# Patient Record
Sex: FEMALE | Race: BLACK OR AFRICAN AMERICAN | HISPANIC OR LATINO | Employment: FULL TIME | URBAN - METROPOLITAN AREA
[De-identification: names, ages, dates, MRNs, and addresses within clinical notes are randomized per-mention and may not be internally consistent; named-entity substitution may affect disease eponyms.]

---

## 2023-04-21 ENCOUNTER — APPOINTMENT (OUTPATIENT)
Dept: PRIMARY CARE | Facility: CLINIC | Age: 58
End: 2023-04-21
Payer: COMMERCIAL

## 2023-05-03 DIAGNOSIS — I10 HYPERTENSION, UNSPECIFIED TYPE: ICD-10-CM

## 2023-05-03 RX ORDER — AMLODIPINE BESYLATE 10 MG/1
10 TABLET ORAL DAILY
Qty: 30 TABLET | Refills: 3 | Status: SHIPPED | OUTPATIENT
Start: 2023-05-03 | End: 2023-11-07 | Stop reason: SDUPTHER

## 2023-05-03 RX ORDER — AMLODIPINE BESYLATE 10 MG/1
10 TABLET ORAL DAILY
COMMUNITY
End: 2023-05-03 | Stop reason: SDUPTHER

## 2023-05-03 RX ORDER — SPIRONOLACTONE 25 MG/1
1 TABLET ORAL DAILY
COMMUNITY
End: 2023-11-30 | Stop reason: SDUPTHER

## 2023-11-07 DIAGNOSIS — I10 HYPERTENSION, UNSPECIFIED TYPE: ICD-10-CM

## 2023-11-07 RX ORDER — AMLODIPINE BESYLATE 10 MG/1
10 TABLET ORAL DAILY
Qty: 30 TABLET | Refills: 0 | Status: SHIPPED | OUTPATIENT
Start: 2023-11-07 | End: 2024-01-02 | Stop reason: SDUPTHER

## 2023-11-07 NOTE — TELEPHONE ENCOUNTER
Rx Refill Request Telephone Encounter    Name:  Betzy Rojas  :  [id number]  Medication Name:  Amlodipine 10mg            Specific Pharmacy location:  [facility]  Date of last appointment:  n/a  Date of next appointment:  n/a  Best number to reach patient:  Pt scheduled for 23  @ 940am

## 2023-11-15 ENCOUNTER — TELEPHONE (OUTPATIENT)
Dept: PRIMARY CARE | Facility: CLINIC | Age: 58
End: 2023-11-15
Payer: COMMERCIAL

## 2023-11-15 NOTE — TELEPHONE ENCOUNTER
Patient would like to get a water pill to go with her bp meds. States she had one at one point but stopped taking it    Please Advise    [facility] in [place]

## 2023-11-30 ENCOUNTER — OFFICE VISIT (OUTPATIENT)
Dept: PRIMARY CARE | Facility: CLINIC | Age: 58
End: 2023-11-30
Payer: COMMERCIAL

## 2023-11-30 VITALS
TEMPERATURE: 97.9 F | OXYGEN SATURATION: 92 % | BODY MASS INDEX: 53.92 KG/M2 | WEIGHT: 293 LBS | SYSTOLIC BLOOD PRESSURE: 146 MMHG | DIASTOLIC BLOOD PRESSURE: 89 MMHG | HEART RATE: 103 BPM | HEIGHT: 62 IN

## 2023-11-30 DIAGNOSIS — I10 HYPERTENSION, UNSPECIFIED TYPE: ICD-10-CM

## 2023-11-30 DIAGNOSIS — Z12.31 BREAST CANCER SCREENING BY MAMMOGRAM: Primary | ICD-10-CM

## 2023-11-30 PROBLEM — T78.40XA ALLERGIC REACTION: Status: ACTIVE | Noted: 2023-11-30

## 2023-11-30 PROBLEM — R79.89 ELEVATED SERUM CREATININE: Status: RESOLVED | Noted: 2023-11-30 | Resolved: 2023-11-30

## 2023-11-30 PROBLEM — R79.89 ELEVATED SERUM CREATININE: Status: ACTIVE | Noted: 2023-11-30

## 2023-11-30 PROCEDURE — 3079F DIAST BP 80-89 MM HG: CPT | Performed by: FAMILY MEDICINE

## 2023-11-30 PROCEDURE — 99214 OFFICE O/P EST MOD 30 MIN: CPT | Performed by: FAMILY MEDICINE

## 2023-11-30 PROCEDURE — 3077F SYST BP >= 140 MM HG: CPT | Performed by: FAMILY MEDICINE

## 2023-11-30 PROCEDURE — 1036F TOBACCO NON-USER: CPT | Performed by: FAMILY MEDICINE

## 2023-11-30 RX ORDER — SPIRONOLACTONE 25 MG/1
12.5 TABLET ORAL DAILY
Qty: 45 TABLET | Refills: 1 | Status: SHIPPED | OUTPATIENT
Start: 2023-11-30 | End: 2023-12-20 | Stop reason: SDUPTHER

## 2023-11-30 ASSESSMENT — ENCOUNTER SYMPTOMS
FEVER: 0
HEADACHES: 0
DIZZINESS: 0
COUGH: 0
HYPERTENSION: 1

## 2023-11-30 NOTE — PROGRESS NOTES
"Subjective   Patient ID: Betzy Rojas is a 58 y.o. female who presents for Hypertension.    Hypertension  This is a recurrent problem. Pertinent negatives include no chest pain or headaches.      Here today for follow-up on hypertension.  Last visit was December 2022  She is currently taking amlodipine 10 mg daily.  She had previously been taking spironolactone, but had stopped this medication because it was causing urinary urgency  She has had difficulty tolerating several other antihypertensives in the past including lisinopril, carvedilol and chlorthalidone.  All 3 of these medications caused tongue tingling  She has not been checking blood pressure out of the office.  Admits to poor diet and feels that she can improve her exercise  No chest pain, palpitations or dizziness    Review of Systems   Constitutional:  Negative for fever.   Respiratory:  Negative for cough.    Cardiovascular:  Positive for leg swelling. Negative for chest pain.   Neurological:  Negative for dizziness and headaches.       Objective   /89   Pulse 103   Temp 36.6 °C (97.9 °F) (Temporal)   Ht 1.575 m (5' 2\")   Wt (!) 164 kg (361 lb 12.8 oz)   SpO2 92%   BMI 66.17 kg/m²     Physical Exam  Vitals reviewed.   Constitutional:       General: She is not in acute distress.     Appearance: Normal appearance. She is well-developed.   HENT:      Head: Normocephalic.   Eyes:      Conjunctiva/sclera: Conjunctivae normal.   Cardiovascular:      Rate and Rhythm: Normal rate and regular rhythm.      Heart sounds: Normal heart sounds.   Pulmonary:      Effort: Pulmonary effort is normal.      Breath sounds: Normal breath sounds.   Musculoskeletal:      Right lower leg: Edema present.      Left lower leg: Edema present.      Comments: Trace bilateral leg edema   Skin:     Findings: No rash.   Neurological:      Mental Status: She is alert.   Psychiatric:         Mood and Affect: Mood normal.         Behavior: Behavior normal. "         Assessment/Plan   Problem List Items Addressed This Visit          Medium    Hypertension    Relevant Medications    spironolactone (Aldactone) 25 mg tablet    Other Relevant Orders    CBC    Comprehensive Metabolic Panel    Lipid Panel    TSH with reflex to Free T4 if abnormal    Referral to Nutrition Services     Other Visit Diagnoses       Breast cancer screening by mammogram    -  Primary    Relevant Orders    BI mammo bilateral screening tomosynthesis        Blood pressure today is elevated at 146/89.  We will restart spironolactone at 12.5 mg daily and continue amlodipine 10 mg daily.  Follow-up in 2 to 3 weeks for a nurse visit to recheck blood pressure.  Recommended getting labs done including CBC, BMP, lipid panel and TSH.  Since we are going to be starting spironolactone, I recommended that she wait at least 2 weeks before getting the labs done  We also discussed lifestyle modification we will refer her to nutritionist  Livier was ordered previously, I encouraged her to submit sample

## 2023-12-13 ENCOUNTER — TELEMEDICINE CLINICAL SUPPORT (OUTPATIENT)
Dept: NUTRITION | Facility: HOSPITAL | Age: 58
End: 2023-12-13
Payer: COMMERCIAL

## 2023-12-13 VITALS — WEIGHT: 293 LBS | HEIGHT: 62 IN | BODY MASS INDEX: 53.92 KG/M2

## 2023-12-13 DIAGNOSIS — I10 HYPERTENSION, UNSPECIFIED TYPE: ICD-10-CM

## 2023-12-13 PROCEDURE — 97802 MEDICAL NUTRITION INDIV IN: CPT | Mod: 95 | Performed by: FAMILY MEDICINE

## 2023-12-13 NOTE — PATIENT INSTRUCTIONS
1) Follow plate method when planning meals (1/2 plate non-starchy vegetables, 1/4 plate lean protein, 1/4 plate carbohydrates).   2) Increase fiber from fruits, vegetables, whole grains, and beans/lentils  3) Choose lean protein sources including chicken, turkey, seafood, and eggs.   4) Aim for at least 3 meals per day - be sure to include protein with each meal and snack  5) Limit added sugar to less than 25 g per day   6) Continue regular physical activity including strength training as medically appropriate per AHA guidelines      Provided pt with the following handouts: wt loss tips, 1500 calorie 5 day meal plan, choose your foods list, exercise, label reading, plate method

## 2023-12-13 NOTE — PROGRESS NOTES
"Nutrition Assessment     Reason for Visit:  Betzy Rojas is a 58 y.o. female who presents for   I10 (ICD-10-CM) - Hypertension, unspecified type     Anthropometrics:  Anthropometrics  Height: 157.5 cm (5' 2\")  Weight: (!) 164 kg (361 lb 8.9 oz)  BMI (Calculated): 66.11  IBW/kg (Dietitian Calculated): 50 kg  Percent of IBW: 328 %  Adjusted Body Weight (kg): 78.5 kg         Food And Nutrient Intake:  Food and Nutrient History  Food and Nutrient History: Met with pt today for wt management and hypertension. Pt reports that she has been gaining wt, unsure of time frame but estimates she has gained ~50 lbs. Pt states that she does not have a consistent meal intake, can go all day without eating. Pt states she tends to eat late at night as well. Pt reports eating take out daily.  Fluid Intake: water, gatorade, mushroom coffee  GI Symptoms: none  Oral Problems: denies  Dentition: own  Sleep Duration/Quality: 5-6 hrs disrupted     Food Intake  Meal 1: skips  Meal 2: skips or take out salad or BLT salad  Meal 3: may skip or have salmon, brocoli, rice from [employer]  Snacks: nuts    Food Preparation  Cooking:  (pt states she does not like to cook)  Grocery Shopping: Patient  Dining Out: Everyday    Micronutrient Intake  Vitamin Intake: Multivitamin         Medication and Complementary/Alternative Medicine Use  Nutrition-Related Complementary/Alternative Medicine Use: tumeric    Physical Activities:  Physical Activity  Physical Activity History: 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, has desk job, 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    Nutrition Focused Physical Exam:  Deferred due to virtual visit         Energy Needs  Calculated Energy Needs Using Equations  Height: 157.5 cm (5' 2\")  Estimated Energy Needs  Total Energy Estimated Needs (kCal): 1600 kCal  Total Estimated Energy Need per Day (kCal/kg): 20 kCal/kg  Method for Estimating Needs: adj IBW        Nutrition Diagnosis   Malnutrition " Diagnosis  Patient has Malnutrition Diagnosis: No  Nutrition Diagnosis  Patient has Nutrition Diagnosis: Yes  Diagnosis Status (1): New  Nutrition Diagnosis 1: Obese  Related to (1): excess caloric intake, inconsistent meal pattern, low active lifestyle  As Evidenced by (1): BMI of 66.13, pt interview and diet recall    Nutrition Interventions/Recommendations   Food and Nutrition Delivery  Meals & Snacks: Carbohydrate-modified diet, Energy-modified diet, Fiber-modified diet, General Healthful Diet, Protein-modified diet, Modify schedule of foods/fluids, Modify Composition of Meals/Snacks, Diets modified for specific foods or ingredients  Nutrition Education  Nutrition Education Content: Content related nutrition education, Education on nutrition's influence on health, Physical activity guidance  Goals: Instructed on counting macronutrient intake, proper portion sizes, label reading and general healthful nutrition. Instructed on benefits of wt loss with diabetes and heart health. Discussed mindful eating, slow gradual wt loss and goals beyond wt. Instructed pt to not skip meals - discussed this may lead to over eating later or snacking more than planned. Instructed on importance of physical activity for wt loss and maintenance of wt loss. Both verbal and written education provided in the one-on-one setting. Pt verbalized understanding of information provided. All questions answered to pt satisfaction throughout visit            Nutrition Monitoring and Evaluation   Food/Nutrient Related History Monitoring  Monitoring and Evaluation Plan: Energy intake, Meal/snack pattern, Fiber intake, Carbohydrate intake, Amount of food, Fluid intake, Protein intake, Mineral/element intake  Criteria: 1600 kcal/day  Criteria: aim for at least 64 oz of water daily  Criteria: Aim for 3 meals/day with 1-2 snack  Meal/Snack Pattern: Food variety, Estimated meal and snack pattern  Criteria: Follow plate method when planning meals (1/2  plate non-starchy vegetables, 1/4 plate lean protein, 1/4 plate carbohydrates).  Criteria: Choose lean protein sources including chicken, turkey, seafood, and eggs. Be sure to include protein with each meal and snack  Criteria: Limit added sugar to less than 25 g per day  Criteria: Increase fiber from fruits, vegetables, whole grains, and beans/lentils  Mineral/Element Intake: Measured mineral/element intake  Criteria: <2300 mg of sodium daily  Knowledge/Beliefs/Attitudes  Monitoring and Evaluation Plan: Physical activity  Criteria: Encouraged regular physical activity including strength training as medically appropriate per AHA guidelines

## 2023-12-13 NOTE — LETTER
"December 13, 2023     Vadim Augustin DO  [address]  [place] [facility] [address]    Patient: Betzy Rojas   YOB: 1965   Date of Visit: 12/13/2023       Dear Dr. Vadim Augustin DO:    Thank you for referring Betzy Rojas to me for evaluation. Below are my notes for this consultation.  If you have questions, please do not hesitate to call me. I look forward to following your patient along with you.       Sincerely,     Joyce Crook, RD, LD      CC: No Recipients  ______________________________________________________________________________________    Nutrition Assessment    Reason for Visit:  Betzy Rojas is a 58 y.o. female who presents for   I10 (ICD-10-CM) - Hypertension, unspecified type     Anthropometrics:  Anthropometrics  Height: 157.5 cm (5' 2\")  Weight: (!) 164 kg (361 lb 8.9 oz)  BMI (Calculated): 66.11  IBW/kg (Dietitian Calculated): 50 kg  Percent of IBW: 328 %  Adjusted Body Weight (kg): 78.5 kg         Food And Nutrient Intake:  Food and Nutrient History  Food and Nutrient History: Met with pt today for wt management and hypertension. Pt reports that she has been gaining wt, unsure of time frame but estimates she has gained ~50 lbs. Pt states that she does not have a consistent meal intake, can go all day without eating. Pt states she tends to eat late at night as well. Pt reports eating take out daily.  Fluid Intake: water, gatorade, mushroom coffee  GI Symptoms: none  Oral Problems: denies  Dentition: own  Sleep Duration/Quality: 5-6 hrs disrupted     Food Intake  Meal 1: skips  Meal 2: skips or take out salad or BLT salad  Meal 3: may skip or have salmon, brocoli, rice from [employer]  Snacks: nuts    Food Preparation  Cooking:  (pt states she does not like to cook)  Grocery Shopping: Patient  Dining Out: Everyday    Micronutrient Intake  Vitamin Intake: Multivitamin         Medication and Complementary/Alternative Medicine Use  Nutrition-Related Complementary/Alternative Medicine " "Use: tumeric    Physical Activities:  Physical Activity  Physical Activity History: 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, has desk job, 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    Nutrition Focused Physical Exam:  Deferred due to virtual visit         Energy Needs  Calculated Energy Needs Using Equations  Height: 157.5 cm (5' 2\")  Estimated Energy Needs  Total Energy Estimated Needs (kCal): 1600 kCal  Total Estimated Energy Need per Day (kCal/kg): 20 kCal/kg  Method for Estimating Needs: adj IBW        Nutrition Diagnosis  Malnutrition Diagnosis  Patient has Malnutrition Diagnosis: No  Nutrition Diagnosis  Patient has Nutrition Diagnosis: Yes  Diagnosis Status (1): New  Nutrition Diagnosis 1: Obese  Related to (1): excess caloric intake, inconsistent meal pattern, low active lifestyle  As Evidenced by (1): BMI of 66.13, pt interview and diet recall    Nutrition Interventions/Recommendations  Food and Nutrition Delivery  Meals & Snacks: Carbohydrate-modified diet, Energy-modified diet, Fiber-modified diet, General Healthful Diet, Protein-modified diet, Modify schedule of foods/fluids, Modify Composition of Meals/Snacks, Diets modified for specific foods or ingredients  Nutrition Education  Nutrition Education Content: Content related nutrition education, Education on nutrition's influence on health, Physical activity guidance  Goals: Instructed on counting macronutrient intake, proper portion sizes, label reading and general healthful nutrition. Instructed on benefits of wt loss with diabetes and heart health. Discussed mindful eating, slow gradual wt loss and goals beyond wt. Instructed pt to not skip meals - discussed this may lead to over eating later or snacking more than planned. Instructed on importance of physical activity for wt loss and maintenance of wt loss. Both verbal and written education provided in the one-on-one setting. Pt verbalized understanding of information provided. All " questions answered to pt satisfaction throughout visit            Nutrition Monitoring and Evaluation  Food/Nutrient Related History Monitoring  Monitoring and Evaluation Plan: Energy intake, Meal/snack pattern, Fiber intake, Carbohydrate intake, Amount of food, Fluid intake, Protein intake, Mineral/element intake  Criteria: 1600 kcal/day  Criteria: aim for at least 64 oz of water daily  Criteria: Aim for 3 meals/day with 1-2 snack  Meal/Snack Pattern: Food variety, Estimated meal and snack pattern  Criteria: Follow plate method when planning meals (1/2 plate non-starchy vegetables, 1/4 plate lean protein, 1/4 plate carbohydrates).  Criteria: Choose lean protein sources including chicken, turkey, seafood, and eggs. Be sure to include protein with each meal and snack  Criteria: Limit added sugar to less than 25 g per day  Criteria: Increase fiber from fruits, vegetables, whole grains, and beans/lentils  Mineral/Element Intake: Measured mineral/element intake  Criteria: <2300 mg of sodium daily  Knowledge/Beliefs/Attitudes  Monitoring and Evaluation Plan: Physical activity  Criteria: Encouraged regular physical activity including strength training as medically appropriate per AHA guidelines

## 2023-12-20 ENCOUNTER — OFFICE VISIT (OUTPATIENT)
Dept: PRIMARY CARE | Facility: CLINIC | Age: 58
End: 2023-12-20
Payer: COMMERCIAL

## 2023-12-20 VITALS
WEIGHT: 293 LBS | DIASTOLIC BLOOD PRESSURE: 96 MMHG | SYSTOLIC BLOOD PRESSURE: 154 MMHG | OXYGEN SATURATION: 95 % | HEART RATE: 111 BPM | BODY MASS INDEX: 66.03 KG/M2

## 2023-12-20 DIAGNOSIS — I10 HYPERTENSION, UNSPECIFIED TYPE: ICD-10-CM

## 2023-12-20 PROCEDURE — 1036F TOBACCO NON-USER: CPT | Performed by: FAMILY MEDICINE

## 2023-12-20 PROCEDURE — 99211 OFF/OP EST MAY X REQ PHY/QHP: CPT | Performed by: FAMILY MEDICINE

## 2023-12-20 PROCEDURE — 3080F DIAST BP >= 90 MM HG: CPT | Performed by: FAMILY MEDICINE

## 2023-12-20 PROCEDURE — 3077F SYST BP >= 140 MM HG: CPT | Performed by: FAMILY MEDICINE

## 2023-12-20 RX ORDER — SPIRONOLACTONE 25 MG/1
25 TABLET ORAL DAILY
Qty: 90 TABLET | Refills: 1
Start: 2023-12-20 | End: 2024-02-05 | Stop reason: SDUPTHER

## 2023-12-20 ASSESSMENT — ENCOUNTER SYMPTOMS: HYPERTENSION: 1

## 2023-12-20 NOTE — PROGRESS NOTES
Subjective   Patient ID: Betzy Rojas is a 58 y.o. female who presents for Hypertension.    Hypertension       I spoke with her.  She stop by the office today for a nurse visit to check blood pressure  She has a history of hypertension currently taking amlodipine 10 mg daily and spironolactone 12.5 mg daily.  Her blood pressure at last visit on 11/30 was 146/89.  We had started spironolactone at that time  She is not having any adverse effects with medication.  She has not yet had a chance to get the labs done that we had ordered previously.  She did see nutritionist and is planning on improving diet and exercise.  She previously was not able to tolerate several other antihypertensives including lisinopril, carvedilol and chlorthalidone due to tongue tingling when taking  Her blood pressure today is elevated at 154/96.  Will increase spironolactone from 12.5 to 25 mg daily and continue amlodipine.  Discussed the importance of getting labs done, especially since she is taking spironolactone, and she will get these done prior to follow-up.  Follow-up in 1 month for a full hypertension recheck    Review of Systems    Objective   There were no vitals taken for this visit.    Physical Exam    Assessment/Plan   Problem List Items Addressed This Visit    None

## 2024-01-02 DIAGNOSIS — I10 HYPERTENSION, UNSPECIFIED TYPE: ICD-10-CM

## 2024-01-02 RX ORDER — AMLODIPINE BESYLATE 10 MG/1
10 TABLET ORAL DAILY
Qty: 90 TABLET | Refills: 1 | Status: SHIPPED | OUTPATIENT
Start: 2024-01-02

## 2024-01-02 NOTE — TELEPHONE ENCOUNTER
Rx Refill Request Telephone Encounter    Name:  Betzy Rojas  :  [id number]  Medication Name: Amlodipine refill request

## 2024-01-13 ENCOUNTER — HOSPITAL ENCOUNTER (OUTPATIENT)
Dept: RADIOLOGY | Facility: HOSPITAL | Age: 59
Discharge: HOME | End: 2024-01-13
Payer: COMMERCIAL

## 2024-01-13 DIAGNOSIS — Z12.31 BREAST CANCER SCREENING BY MAMMOGRAM: ICD-10-CM

## 2024-01-13 PROCEDURE — 77063 BREAST TOMOSYNTHESIS BI: CPT | Performed by: RADIOLOGY

## 2024-01-13 PROCEDURE — 77067 SCR MAMMO BI INCL CAD: CPT

## 2024-01-13 PROCEDURE — 77067 SCR MAMMO BI INCL CAD: CPT | Performed by: RADIOLOGY

## 2024-01-25 ENCOUNTER — LAB (OUTPATIENT)
Dept: LAB | Facility: LAB | Age: 59
End: 2024-01-25
Payer: COMMERCIAL

## 2024-01-25 DIAGNOSIS — R73.01 IFG (IMPAIRED FASTING GLUCOSE): ICD-10-CM

## 2024-01-25 DIAGNOSIS — R73.01 IFG (IMPAIRED FASTING GLUCOSE): Primary | ICD-10-CM

## 2024-01-25 DIAGNOSIS — I10 HYPERTENSION, UNSPECIFIED TYPE: ICD-10-CM

## 2024-01-25 LAB
ALBUMIN SERPL BCP-MCNC: 4.2 G/DL (ref 3.4–5)
ALP SERPL-CCNC: 88 U/L (ref 33–110)
ALT SERPL W P-5'-P-CCNC: 13 U/L (ref 7–45)
ANION GAP SERPL CALC-SCNC: 13 MMOL/L (ref 10–20)
AST SERPL W P-5'-P-CCNC: 16 U/L (ref 9–39)
BILIRUB SERPL-MCNC: 0.8 MG/DL (ref 0–1.2)
BUN SERPL-MCNC: 13 MG/DL (ref 6–23)
CALCIUM SERPL-MCNC: 9.6 MG/DL (ref 8.6–10.3)
CHLORIDE SERPL-SCNC: 102 MMOL/L (ref 98–107)
CHOLEST SERPL-MCNC: 174 MG/DL (ref 0–199)
CHOLESTEROL/HDL RATIO: 4.4
CO2 SERPL-SCNC: 30 MMOL/L (ref 21–32)
CREAT SERPL-MCNC: 1.01 MG/DL (ref 0.5–1.05)
EGFRCR SERPLBLD CKD-EPI 2021: 65 ML/MIN/1.73M*2
ERYTHROCYTE [DISTWIDTH] IN BLOOD BY AUTOMATED COUNT: 15.4 % (ref 11.5–14.5)
EST. AVERAGE GLUCOSE BLD GHB EST-MCNC: 134 MG/DL
GLUCOSE SERPL-MCNC: 114 MG/DL (ref 74–99)
HBA1C MFR BLD: 6.3 %
HCT VFR BLD AUTO: 43.2 % (ref 36–46)
HDLC SERPL-MCNC: 40 MG/DL
HGB BLD-MCNC: 13.6 G/DL (ref 12–16)
LDLC SERPL CALC-MCNC: 119 MG/DL
MCH RBC QN AUTO: 25.9 PG (ref 26–34)
MCHC RBC AUTO-ENTMCNC: 31.5 G/DL (ref 32–36)
MCV RBC AUTO: 82 FL (ref 80–100)
NON HDL CHOLESTEROL: 134 MG/DL (ref 0–149)
NRBC BLD-RTO: 0 /100 WBCS (ref 0–0)
PLATELET # BLD AUTO: 315 X10*3/UL (ref 150–450)
POTASSIUM SERPL-SCNC: 4.8 MMOL/L (ref 3.5–5.3)
PROT SERPL-MCNC: 7.3 G/DL (ref 6.4–8.2)
RBC # BLD AUTO: 5.25 X10*6/UL (ref 4–5.2)
SODIUM SERPL-SCNC: 140 MMOL/L (ref 136–145)
TRIGL SERPL-MCNC: 77 MG/DL (ref 0–149)
TSH SERPL-ACNC: 0.91 MIU/L (ref 0.44–3.98)
VLDL: 15 MG/DL (ref 0–40)
WBC # BLD AUTO: 5.6 X10*3/UL (ref 4.4–11.3)

## 2024-01-25 PROCEDURE — 36415 COLL VENOUS BLD VENIPUNCTURE: CPT

## 2024-01-25 PROCEDURE — 85027 COMPLETE CBC AUTOMATED: CPT

## 2024-01-25 PROCEDURE — 84443 ASSAY THYROID STIM HORMONE: CPT

## 2024-01-25 PROCEDURE — 83036 HEMOGLOBIN GLYCOSYLATED A1C: CPT

## 2024-01-25 PROCEDURE — 80061 LIPID PANEL: CPT

## 2024-01-25 PROCEDURE — 80053 COMPREHEN METABOLIC PANEL: CPT

## 2024-01-26 ENCOUNTER — OFFICE VISIT (OUTPATIENT)
Dept: PRIMARY CARE | Facility: CLINIC | Age: 59
End: 2024-01-26
Payer: COMMERCIAL

## 2024-01-26 VITALS — OXYGEN SATURATION: 94 % | SYSTOLIC BLOOD PRESSURE: 144 MMHG | HEART RATE: 119 BPM | DIASTOLIC BLOOD PRESSURE: 80 MMHG

## 2024-01-26 DIAGNOSIS — Z12.11 SCREENING FOR COLON CANCER: ICD-10-CM

## 2024-01-26 DIAGNOSIS — R73.03 PREDIABETES: ICD-10-CM

## 2024-01-26 DIAGNOSIS — I10 HYPERTENSION, UNSPECIFIED TYPE: Primary | ICD-10-CM

## 2024-01-26 PROCEDURE — 3079F DIAST BP 80-89 MM HG: CPT | Performed by: FAMILY MEDICINE

## 2024-01-26 PROCEDURE — 99214 OFFICE O/P EST MOD 30 MIN: CPT | Performed by: FAMILY MEDICINE

## 2024-01-26 PROCEDURE — 3077F SYST BP >= 140 MM HG: CPT | Performed by: FAMILY MEDICINE

## 2024-01-26 PROCEDURE — 1036F TOBACCO NON-USER: CPT | Performed by: FAMILY MEDICINE

## 2024-01-26 ASSESSMENT — ENCOUNTER SYMPTOMS
FEVER: 0
COUGH: 0
DIZZINESS: 0

## 2024-01-26 NOTE — PROGRESS NOTES
Subjective   Patient ID: Betzy Rojas is a 58 y.o. female who presents for Hypertension.    HPI   Here today for follow-up on hypertension  Currently taking spironolactone 25 mg, and amlodipine 10 mg daily.  She is supposed to be taking spironolactone every day, but she has been only taking this medication every other day.  She last took this medication 2 to 3 days ago  She is planning on improving exercise and is going to be joining a gym.  Has been working on improving her diet  Does not check blood pressure out of the office  She previously could not tolerate lisinopril, carvedilol, and chlorthalidone due to tongue tingling when taking  We reviewed labs from yesterday which showed the following  A1c: 6.3%  Lipid panel showed total cholesterol 174, , triglycerides 77, HDL 40.0      Review of Systems   Constitutional:  Negative for fever.   Respiratory:  Negative for cough.    Cardiovascular:  Positive for leg swelling. Negative for chest pain.   Neurological:  Negative for dizziness.       Objective   /80   Pulse (!) 119   LMP  (LMP Unknown)   SpO2 94%     Physical Exam  Vitals reviewed.   Constitutional:       General: She is not in acute distress.     Appearance: Normal appearance. She is well-developed.   HENT:      Head: Normocephalic.   Eyes:      Conjunctiva/sclera: Conjunctivae normal.   Cardiovascular:      Rate and Rhythm: Normal rate and regular rhythm.      Heart sounds: Normal heart sounds.   Pulmonary:      Effort: Pulmonary effort is normal.      Breath sounds: Normal breath sounds.   Musculoskeletal:      Right lower leg: No edema.      Left lower leg: No edema.   Skin:     Findings: No rash.   Neurological:      Mental Status: She is alert.   Psychiatric:         Mood and Affect: Mood normal.         Behavior: Behavior normal.         Assessment/Plan   Problem List Items Addressed This Visit          Medium    Hypertension - Primary     Other Visit Diagnoses       Prediabetes         Screening for colon cancer        Relevant Orders    Cologuard® colon cancer screening        Hypertension: Her blood pressure today is elevated at 144/80.  She has only been taking spironolactone every other day, and took this medication last approximately 2 to 3 days ago.  She is agreeable to taking this medicine daily.  Continue spironolactone 25 mg daily will continue amlodipine 10 mg daily.  She will continue to work on healthy diet and exercise and weight loss.  Follow-up in 3 to 4 months to recheck blood pressure  Her A1c is in the prediabetes range at 6.3%.  We discussed healthy diet and we will plan on checking this again in 4 to 6 months  Also discussed the importance of colon cancer screening and I reordered Cologuard

## 2024-02-05 DIAGNOSIS — I10 HYPERTENSION, UNSPECIFIED TYPE: ICD-10-CM

## 2024-02-05 RX ORDER — SPIRONOLACTONE 25 MG/1
25 TABLET ORAL DAILY
Qty: 90 TABLET | Refills: 1 | Status: SHIPPED | OUTPATIENT
Start: 2024-02-05

## 2024-02-05 NOTE — TELEPHONE ENCOUNTER
Rx Refill Request Telephone Encounter    Name:  Betzy Bob  :  [id number]  Medication Name: spironolactone refill request

## 2024-05-24 ENCOUNTER — APPOINTMENT (OUTPATIENT)
Dept: PRIMARY CARE | Facility: CLINIC | Age: 59
End: 2024-05-24
Payer: COMMERCIAL

## 2024-06-05 ENCOUNTER — APPOINTMENT (OUTPATIENT)
Dept: PRIMARY CARE | Facility: CLINIC | Age: 59
End: 2024-06-05
Payer: COMMERCIAL

## 2024-08-15 ENCOUNTER — TELEPHONE (OUTPATIENT)
Dept: PRIMARY CARE | Facility: CLINIC | Age: 59
End: 2024-08-15
Payer: COMMERCIAL

## 2024-08-15 NOTE — TELEPHONE ENCOUNTER
Patient had relocated to [facility] for work and was unable to come in for an appointment.  She is going on vacation and will call back when she returns to schedule.    ----- Message -----  From: Vadim Augustin DO  Sent: 8/15/2024  10:58 AM EDT  To: Do Emiliana Eller [employer]    She is due for a follow-up on hypertension, please call and schedule

## 2024-09-18 DIAGNOSIS — I10 HYPERTENSION, UNSPECIFIED TYPE: ICD-10-CM

## 2024-09-18 RX ORDER — AMLODIPINE BESYLATE 10 MG/1
10 TABLET ORAL DAILY
Qty: 90 TABLET | Refills: 3 | Status: SHIPPED | OUTPATIENT
Start: 2024-09-18

## 2024-09-26 ENCOUNTER — OFFICE VISIT (OUTPATIENT)
Dept: PRIMARY CARE | Facility: CLINIC | Age: 59
End: 2024-09-26
Payer: COMMERCIAL

## 2024-09-26 VITALS — SYSTOLIC BLOOD PRESSURE: 140 MMHG | OXYGEN SATURATION: 97 % | HEART RATE: 115 BPM | DIASTOLIC BLOOD PRESSURE: 88 MMHG

## 2024-09-26 DIAGNOSIS — I10 HYPERTENSION, UNSPECIFIED TYPE: Primary | ICD-10-CM

## 2024-09-26 PROCEDURE — 3079F DIAST BP 80-89 MM HG: CPT | Performed by: FAMILY MEDICINE

## 2024-09-26 PROCEDURE — 99214 OFFICE O/P EST MOD 30 MIN: CPT | Performed by: FAMILY MEDICINE

## 2024-09-26 PROCEDURE — 3077F SYST BP >= 140 MM HG: CPT | Performed by: FAMILY MEDICINE

## 2024-09-26 PROCEDURE — 1036F TOBACCO NON-USER: CPT | Performed by: FAMILY MEDICINE

## 2024-09-26 ASSESSMENT — ENCOUNTER SYMPTOMS
FEVER: 0
COUGH: 1
HYPERTENSION: 1

## 2024-09-26 NOTE — PROGRESS NOTES
Subjective   Patient ID: Betzy Rojas is a 59 y.o. female who presents for Hypertension.    Hypertension  Pertinent negatives include no chest pain.      Here today for follow-up on hypertension  She is currently taking amlodipine 10 mg daily and spironolactone 25 mg.  She is supposed to be taking spironolactone every day, but has only been taking it on average every other day.  She states her last dose of spironolactone was a few days ago.  No adverse effects with either medication  She has not been checking blood pressure out of the office  She is planning on improving exercise and working on diet  She previously had difficulty tolerating several other antihypertensive medications in the past including lisinopril, carvedilol and chlorthalidone (developed tongue tingling when taking)  She has a history of A1c in the prediabetic range in the past.  Her last A1c checked in January of this year was 6.3%    Review of Systems   Constitutional:  Negative for fever.   Respiratory:  Positive for cough.    Cardiovascular:  Negative for chest pain and leg swelling.       Objective   /88   Pulse (!) 115   LMP  (LMP Unknown)   SpO2 97%     Physical Exam  Vitals reviewed.   Constitutional:       General: She is not in acute distress.     Appearance: Normal appearance. She is well-developed.   HENT:      Head: Normocephalic.   Eyes:      Conjunctiva/sclera: Conjunctivae normal.   Cardiovascular:      Rate and Rhythm: Normal rate and regular rhythm.      Heart sounds: Normal heart sounds.   Pulmonary:      Effort: Pulmonary effort is normal.      Breath sounds: Normal breath sounds.   Musculoskeletal:      Right lower leg: No edema.      Left lower leg: No edema.   Skin:     Findings: No rash.   Neurological:      Mental Status: She is alert.   Psychiatric:         Mood and Affect: Mood normal.         Behavior: Behavior normal.         Assessment/Plan   Assessment & Plan  Hypertension, unspecified type         Her blood  pressure was initially elevated at 175/92.  This improved to 140/88 on recheck.  Her blood pressure today is still above goal.  She has not been taking spironolactone on a regular basis and has not taken in the past several days.  I recommended that she start taking spironolactone every day and continue amlodipine 10 mg daily  Continue to work on healthy diet and regular exercise and I recommended that she follow-up in the next 1 to 2 months for a nurse visit to recheck her blood pressure  I did offer to recheck labs including A1c.  She would prefer to wait until next visit to get labs done  Follow-up in 1 to 2 months for nurse visit to check blood pressure and in 3 months for hypertension recheck

## 2024-10-09 ENCOUNTER — APPOINTMENT (OUTPATIENT)
Dept: PRIMARY CARE | Facility: CLINIC | Age: 59
End: 2024-10-09
Payer: COMMERCIAL

## 2024-11-07 ENCOUNTER — APPOINTMENT (OUTPATIENT)
Dept: PRIMARY CARE | Facility: CLINIC | Age: 59
End: 2024-11-07
Payer: COMMERCIAL

## 2024-11-13 ENCOUNTER — TELEPHONE (OUTPATIENT)
Dept: PRIMARY CARE | Facility: CLINIC | Age: 59
End: 2024-11-13

## 2024-11-13 NOTE — TELEPHONE ENCOUNTER
Patient had to cancel her appointment for BP check (nurse visit) on 11/15/24.  She stated that she no longer is working at her company and is without medical insurance.  She states that she is signing up for COBRA and will call once she receives the information, and will be good to go for her appointment in January.  She is hoping to still receive her BP refills as needed until then.

## 2024-11-15 ENCOUNTER — APPOINTMENT (OUTPATIENT)
Dept: PRIMARY CARE | Facility: CLINIC | Age: 59
End: 2024-11-15
Payer: COMMERCIAL

## 2024-11-26 DIAGNOSIS — I10 HYPERTENSION, UNSPECIFIED TYPE: ICD-10-CM

## 2024-11-26 RX ORDER — SPIRONOLACTONE 25 MG/1
25 TABLET ORAL DAILY
Qty: 90 TABLET | Refills: 1 | Status: SHIPPED | OUTPATIENT
Start: 2024-11-26

## 2025-01-07 ENCOUNTER — APPOINTMENT (OUTPATIENT)
Dept: PRIMARY CARE | Facility: CLINIC | Age: 60
End: 2025-01-07
Payer: COMMERCIAL

## 2025-02-13 ENCOUNTER — APPOINTMENT (OUTPATIENT)
Dept: PRIMARY CARE | Facility: CLINIC | Age: 60
End: 2025-02-13

## 2025-02-13 DIAGNOSIS — I10 HYPERTENSION, UNSPECIFIED TYPE: ICD-10-CM

## 2025-02-13 RX ORDER — AMLODIPINE BESYLATE 10 MG/1
10 TABLET ORAL DAILY
Qty: 90 TABLET | Refills: 0 | Status: SHIPPED | OUTPATIENT
Start: 2025-02-13

## 2025-02-13 NOTE — TELEPHONE ENCOUNTER
Pt still in [facility]. Requesting refill. Should be back in a few weeks and schedule appt.

## 2025-02-28 ENCOUNTER — APPOINTMENT (OUTPATIENT)
Dept: PRIMARY CARE | Facility: CLINIC | Age: 60
End: 2025-02-28

## 2025-03-28 ENCOUNTER — APPOINTMENT (OUTPATIENT)
Dept: PRIMARY CARE | Facility: CLINIC | Age: 60
End: 2025-03-28

## 2025-04-17 ENCOUNTER — APPOINTMENT (OUTPATIENT)
Dept: PRIMARY CARE | Facility: CLINIC | Age: 60
End: 2025-04-17

## 2025-05-20 ENCOUNTER — HOSPITAL ENCOUNTER (OUTPATIENT)
Dept: RADIOLOGY | Facility: HOSPITAL | Age: 60
Discharge: HOME | End: 2025-05-20
Payer: COMMERCIAL

## 2025-05-20 ENCOUNTER — TELEPHONE (OUTPATIENT)
Dept: PRIMARY CARE | Facility: CLINIC | Age: 60
End: 2025-05-20
Payer: COMMERCIAL

## 2025-05-20 DIAGNOSIS — Z12.31 ENCOUNTER FOR SCREENING MAMMOGRAM FOR MALIGNANT NEOPLASM OF BREAST: ICD-10-CM

## 2025-05-20 DIAGNOSIS — Z12.11 SCREENING FOR COLON CANCER: Primary | ICD-10-CM

## 2025-05-20 PROCEDURE — 77067 SCR MAMMO BI INCL CAD: CPT

## 2025-05-20 PROCEDURE — 77063 BREAST TOMOSYNTHESIS BI: CPT | Performed by: RADIOLOGY

## 2025-05-20 PROCEDURE — 77067 SCR MAMMO BI INCL CAD: CPT | Performed by: RADIOLOGY

## 2025-05-20 NOTE — TELEPHONE ENCOUNTER
Pt called in stating she never received her Cologaurd and she would like to complete that before her upcoming appt in [place]

## 2025-05-23 ENCOUNTER — OFFICE VISIT (OUTPATIENT)
Dept: OBSTETRICS AND GYNECOLOGY | Facility: CLINIC | Age: 60
End: 2025-05-23
Payer: COMMERCIAL

## 2025-05-23 ENCOUNTER — APPOINTMENT (OUTPATIENT)
Dept: OBSTETRICS AND GYNECOLOGY | Facility: CLINIC | Age: 60
End: 2025-05-23
Payer: COMMERCIAL

## 2025-05-23 VITALS — SYSTOLIC BLOOD PRESSURE: 129 MMHG | BODY MASS INDEX: 68.41 KG/M2 | DIASTOLIC BLOOD PRESSURE: 89 MMHG | WEIGHT: 293 LBS

## 2025-05-23 DIAGNOSIS — N93.9 ABNORMAL UTERINE BLEEDING (AUB): ICD-10-CM

## 2025-05-23 DIAGNOSIS — N95.0 POSTMENOPAUSAL BLEEDING: Primary | ICD-10-CM

## 2025-05-23 PROCEDURE — 99203 OFFICE O/P NEW LOW 30 MIN: CPT | Performed by: OBSTETRICS & GYNECOLOGY

## 2025-05-23 PROCEDURE — 3079F DIAST BP 80-89 MM HG: CPT | Performed by: OBSTETRICS & GYNECOLOGY

## 2025-05-23 PROCEDURE — 3074F SYST BP LT 130 MM HG: CPT | Performed by: OBSTETRICS & GYNECOLOGY

## 2025-05-23 PROCEDURE — 58100 BIOPSY OF UTERUS LINING: CPT | Performed by: OBSTETRICS & GYNECOLOGY

## 2025-05-23 PROCEDURE — 1036F TOBACCO NON-USER: CPT | Performed by: OBSTETRICS & GYNECOLOGY

## 2025-05-23 RX ORDER — BUPIVACAINE HYDROCHLORIDE 2.5 MG/ML
10 INJECTION, SOLUTION INFILTRATION; PERINEURAL ONCE
Status: COMPLETED | OUTPATIENT
Start: 2025-05-23 | End: 2025-05-23

## 2025-05-23 RX ADMIN — BUPIVACAINE HYDROCHLORIDE 25 MG: 2.5 INJECTION, SOLUTION INFILTRATION; PERINEURAL at 15:20

## 2025-05-23 ASSESSMENT — LIFESTYLE VARIABLES
HOW OFTEN DO YOU HAVE SIX OR MORE DRINKS ON ONE OCCASION: NEVER
HOW OFTEN DO YOU HAVE A DRINK CONTAINING ALCOHOL: NEVER
SKIP TO QUESTIONS 9-10: 1
HOW MANY STANDARD DRINKS CONTAINING ALCOHOL DO YOU HAVE ON A TYPICAL DAY: PATIENT DOES NOT DRINK
AUDIT-C TOTAL SCORE: 0

## 2025-05-23 ASSESSMENT — PATIENT HEALTH QUESTIONNAIRE - PHQ9
1. LITTLE INTEREST OR PLEASURE IN DOING THINGS: NOT AT ALL
2. FEELING DOWN, DEPRESSED OR HOPELESS: NOT AT ALL
SUM OF ALL RESPONSES TO PHQ9 QUESTIONS 1 & 2: 0

## 2025-05-23 ASSESSMENT — SOCIAL DETERMINANTS OF HEALTH (SDOH)

## 2025-05-23 NOTE — PROGRESS NOTES
GYNECOLOGY PROGRESS NOTE          CC:     Chief Complaint   Patient presents with    Vaginal Bleeding        HPI:  Betzy Rojas is here with new complaint of postmenopausal bleeding times 1.5 weeks, heavy.  Menopause around 51, not sexually active for 9 months.  No h/o fibroids, endometriosis, ovarian cysts.  Does not remember last PAP test.  Some cramping, not lightheaded or dizzy.  Working with PCP on HTN/glucose.  G0 by choice.    Ricarda Barnes MD  Medical History[1]  Surgical History[2]  Social History[3]  Family History[4]   [unfilled]    ROS:  GYN - see HPI        PHYSICAL EXAM:  /89   Wt (!) 170 kg (374 lb)   LMP  (LMP Unknown)   BMI 68.41 kg/m²   GEN:  A&O, NAD  HEENT:  head HC/AT, no visible goiter  PSYCH:  normal affect, non-anxious  EGBUS normal, vaginal normal, cervix nullip normal  Uterus anteverted non tender, adnexa not palpable    IMPRESSION/PLAN:    Problem List Items Addressed This Visit    None  Visit Diagnoses         Postmenopausal bleeding    -  Primary    Relevant Medications    BUPivacaine HCl (Marcaine) 0.25 % (2.5 mg/mL) injection 25 mg (Completed) (Start on 5/23/2025  3:45 PM)    Other Relevant Orders    US PELVIS TRANSABDOMINAL WITH TRANSVAGINAL    THINPREP PAP TEST (>30)    Endometrial biopsy    Surgical Pathology Exam      Abnormal uterine bleeding (AUB)        Relevant Medications    BUPivacaine HCl (Marcaine) 0.25 % (2.5 mg/mL) injection 25 mg (Completed) (Start on 5/23/2025  3:45 PM)    Other Relevant Orders    THINPREP PAP TEST (>30)    Endometrial biopsy    Surgical Pathology Exam            D/w with pt could be uterine polyp or cancer which can be treated.  EMB performed,  Need pelvic US will call with results.Patient ID: Betzy Rojas is a 59 y.o. female.    Endometrial biopsy    Date/Time: 5/23/2025 3:39 PM    Performed by: Ricrada CABA MD  Authorized by: Ricarda CABA MD    Consent:     Consent obtained: verbal    Consent given by: patient     Risks discussed: bleeding  Indications:     Indications: postmenopausal bleeding    Procedure:     A bimanual exam was performed: yes      Uterus position: anteverted    Prepped with: Betadine    Tenaculum used: yes      A local block was performed: yes      Block method: paracervical block      Local anesthetic: bupivacaine 0.25% w/o epi    Amount used (mL): 10    Cervix dilated: no      Number of passes: 2  Findings:     Cervix: normal      Uterus depth by sound (cm): 10    Specimen collected: specimen collected and sent to pathology      Patient tolerance: tolerated well, no immediate complications               [1]   Past Medical History:  Diagnosis Date    Hypertension    [2]   Past Surgical History:  Procedure Laterality Date    OTHER SURGICAL HISTORY  2020    No history of surgery   [3]   Social History  Tobacco Use    Smoking status: Never    Smokeless tobacco: Never   Vaping Use    Vaping status: Never Used   Substance Use Topics    Alcohol use: Not Currently    Drug use: Never   [4]   Family History  Problem Relation Name Age of Onset    Breast cancer Mother Rubi Cash ()     Hypertension Mother Rubi Cash ()     Heart disease Mother Rubi Cash ()     Kidney disease Mother Rubi Shreya ()     Hypertension Father Trevor ()     Diabetes Father Trevor ()     Heart disease Father Trevor ()     Stroke Father Trevor ()

## 2025-05-28 ENCOUNTER — HOSPITAL ENCOUNTER (OUTPATIENT)
Dept: RADIOLOGY | Facility: HOSPITAL | Age: 60
Discharge: HOME | End: 2025-05-28
Payer: COMMERCIAL

## 2025-05-28 DIAGNOSIS — N95.0 POSTMENOPAUSAL BLEEDING: ICD-10-CM

## 2025-05-28 PROCEDURE — 76856 US EXAM PELVIC COMPLETE: CPT | Performed by: RADIOLOGY

## 2025-05-28 PROCEDURE — 76830 TRANSVAGINAL US NON-OB: CPT

## 2025-05-28 PROCEDURE — 76856 US EXAM PELVIC COMPLETE: CPT

## 2025-05-28 PROCEDURE — 76830 TRANSVAGINAL US NON-OB: CPT | Performed by: RADIOLOGY

## 2025-06-05 DIAGNOSIS — I10 HYPERTENSION, UNSPECIFIED TYPE: ICD-10-CM

## 2025-06-05 RX ORDER — AMLODIPINE BESYLATE 10 MG/1
10 TABLET ORAL DAILY
Qty: 90 TABLET | Refills: 0 | Status: SHIPPED | OUTPATIENT
Start: 2025-06-05

## 2025-06-09 LAB
LABORATORY COMMENT REPORT: NORMAL
PATH REPORT.FINAL DX SPEC: NORMAL
PATH REPORT.GROSS SPEC: NORMAL
PATH REPORT.RELEVANT HX SPEC: NORMAL
PATH REPORT.TOTAL CANCER: NORMAL

## 2025-06-12 LAB
CYTOLOGY CMNT CVX/VAG CYTO-IMP: NORMAL
HPV HR 12 DNA GENITAL QL NAA+PROBE: NEGATIVE
HPV HR GENOTYPES PNL CVX NAA+PROBE: NEGATIVE
HPV16 DNA SPEC QL NAA+PROBE: NEGATIVE
HPV18 DNA SPEC QL NAA+PROBE: NEGATIVE
LAB AP HPV GENOTYPE QUESTION: YES
LAB AP HPV HR: NORMAL
LABORATORY COMMENT REPORT: NORMAL
LABORATORY COMMENT REPORT: NORMAL
PATH REPORT.TOTAL CANCER: NORMAL

## 2025-06-14 LAB — NONINV COLON CA DNA+OCC BLD SCRN STL QL: NEGATIVE

## 2025-06-18 ENCOUNTER — APPOINTMENT (OUTPATIENT)
Dept: OBSTETRICS AND GYNECOLOGY | Facility: CLINIC | Age: 60
End: 2025-06-18
Payer: COMMERCIAL

## 2025-06-18 DIAGNOSIS — N95.0 POSTMENOPAUSAL BLEEDING: Primary | ICD-10-CM

## 2025-06-18 PROCEDURE — 1036F TOBACCO NON-USER: CPT | Performed by: OBSTETRICS & GYNECOLOGY

## 2025-06-18 PROCEDURE — 99213 OFFICE O/P EST LOW 20 MIN: CPT | Performed by: OBSTETRICS & GYNECOLOGY

## 2025-06-18 NOTE — PROGRESS NOTES
Virtual or Telephone Consent    An interactive audio and video telecommunication system which permits real time communications between the patient (at the originating site) and provider (at the distant site) was utilized to provide this telehealth service.   Verbal consent was requested and obtained from Betzy Rojas on this date, 25 for a telehealth visit and the patient's location was confirmed at the time of the visit.      GYNECOLOGY PROGRESS NOTE          CC:   No chief complaint on file.       HPI:  Betzy Rojas is here to follow up on postmenopausal bleeding.  EMB proliferative endometrium, US multifibroid uterus, couldn't eval endometrial stripe.  To review options    Ricarda Barnes MD  Medical History[1]  Surgical History[2]  Social History[3]  Family History[4]   [unfilled]    ROS:  GYN - see HPI        PHYSICAL EXAM:  LMP  (LMP Unknown)   GEN:  A&O, NAD  HEENT:  head HC/AT, no visible goiter  PSYCH:  normal affect, non-anxious      IMPRESSION/PLAN:    Problem List Items Addressed This Visit    None  Visit Diagnoses         Postmenopausal bleeding    -  Primary            Reviewed benign pathology however keeps having PMB every couple of years, at risk for uterine cancer given BMI and PMB.  Discussed oral/intrauterine progesterone and repeat sampling versus hysterectomy.  Considering hysterectomy will refer to Dr Farmer.           [1]   Past Medical History:  Diagnosis Date    Hypertension    [2]   Past Surgical History:  Procedure Laterality Date    OTHER SURGICAL HISTORY  2020    No history of surgery   [3]   Social History  Tobacco Use    Smoking status: Never    Smokeless tobacco: Never   Vaping Use    Vaping status: Never Used   Substance Use Topics    Alcohol use: Not Currently    Drug use: Never   [4]   Family History  Problem Relation Name Age of Onset    Breast cancer Mother Rubi Cash ()     Hypertension Mother Rubi Cash ()     Heart disease Mother Rubi Cash  ()     Kidney disease Mother Rubi Cash ()     Hypertension Father Trevor ()     Diabetes Father Trevor ()     Heart disease Father Trevor ()     Stroke Father Trevor ()

## 2025-06-20 ENCOUNTER — APPOINTMENT (OUTPATIENT)
Dept: PRIMARY CARE | Facility: CLINIC | Age: 60
End: 2025-06-20
Payer: COMMERCIAL

## 2025-06-20 VITALS — OXYGEN SATURATION: 95 % | DIASTOLIC BLOOD PRESSURE: 79 MMHG | SYSTOLIC BLOOD PRESSURE: 136 MMHG | HEART RATE: 120 BPM

## 2025-06-20 DIAGNOSIS — I10 HYPERTENSION, UNSPECIFIED TYPE: Primary | ICD-10-CM

## 2025-06-20 DIAGNOSIS — E11.9 TYPE 2 DIABETES MELLITUS WITHOUT COMPLICATION, WITHOUT LONG-TERM CURRENT USE OF INSULIN: ICD-10-CM

## 2025-06-20 LAB — POC HEMOGLOBIN A1C: 6.9 % (ref 4.2–6.5)

## 2025-06-20 PROCEDURE — 3075F SYST BP GE 130 - 139MM HG: CPT | Performed by: FAMILY MEDICINE

## 2025-06-20 PROCEDURE — 99214 OFFICE O/P EST MOD 30 MIN: CPT | Performed by: FAMILY MEDICINE

## 2025-06-20 PROCEDURE — 83036 HEMOGLOBIN GLYCOSYLATED A1C: CPT | Performed by: FAMILY MEDICINE

## 2025-06-20 PROCEDURE — 3078F DIAST BP <80 MM HG: CPT | Performed by: FAMILY MEDICINE

## 2025-06-20 PROCEDURE — 1036F TOBACCO NON-USER: CPT | Performed by: FAMILY MEDICINE

## 2025-06-20 PROCEDURE — 3044F HG A1C LEVEL LT 7.0%: CPT | Performed by: FAMILY MEDICINE

## 2025-06-20 RX ORDER — DEXTROSE 4 G
TABLET,CHEWABLE ORAL
Qty: 1 EACH | Refills: 0 | Status: SHIPPED | OUTPATIENT
Start: 2025-06-20

## 2025-06-20 RX ORDER — IBUPROFEN 200 MG
1 CAPSULE ORAL 3 TIMES DAILY
Qty: 100 STRIP | Refills: 3 | Status: SHIPPED | OUTPATIENT
Start: 2025-06-20

## 2025-06-20 RX ORDER — LANCETS
1 EACH MISCELLANEOUS 3 TIMES DAILY
Qty: 100 EACH | Refills: 3 | Status: SHIPPED | OUTPATIENT
Start: 2025-06-20

## 2025-06-20 ASSESSMENT — ENCOUNTER SYMPTOMS
PND: 0
PND: 0
HYPERTENSION: 1
NECK PAIN: 0
SHORTNESS OF BREATH: 0
SWEATS: 0
SWEATS: 0
PALPITATIONS: 0
BLURRED VISION: 0
SHORTNESS OF BREATH: 0
HEADACHES: 0
PALPITATIONS: 0
ORTHOPNEA: 0
NECK PAIN: 0
HEADACHES: 0
ORTHOPNEA: 0
HYPERTENSION: 1
BLURRED VISION: 0

## 2025-06-20 NOTE — ASSESSMENT & PLAN NOTE
Orders:    CBC; Future    Comprehensive Metabolic Panel; Future    Lipid Panel; Future    TSH with reflex to Free T4 if abnormal; Future    POCT glycosylated hemoglobin (Hb A1C) manually resulted    Albumin-Creatinine Ratio, Urine Random; Future    blood-glucose meter misc; Use as directed    blood sugar diagnostic (Blood Glucose Test); 1 strip 3 times a day.    lancets misc; 1 Lancet 3 times a day.

## 2025-06-20 NOTE — PROGRESS NOTES
Subjective   Patient ID: Betzy Rojas is a 59 y.o. female who presents for Hypertension.    Hypertension  This is a chronic problem. The current episode started more than 1 year ago. The problem is unchanged. The problem is controlled. Associated symptoms include peripheral edema. Pertinent negatives include no anxiety, blurred vision, chest pain, headaches, malaise/fatigue, neck pain, orthopnea, palpitations, PND, shortness of breath or sweats. Agents associated with hypertension include no associated agents and decongestants. There are no associated agents and decongestants to hypertension. Risk factors for coronary artery disease include family history. Compliance problems include diet and exercise.      She is here today for follow-up on hypertension  She is currently taking spironolactone 25 mg daily and amlodipine 10 mg daily.  She previously had difficulty tolerating several other antihypertensives including lisinopril, carvedilol and chlorthalidone due to tongue tingling  She does not check blood pressure out of the office, however last month it was 129/89 when she saw gynecology  Denies any headache or dizziness  She has seen nutrition in the past  She has a history of prediabetes in the past.  Her last A1c checked in 2024 was 6.3%      Patient Health Questionnaire-2 Score: 0 (6/20/2025  1:37 PM)      Review of Systems   Constitutional:  Negative for malaise/fatigue.   Eyes:  Negative for blurred vision.   Respiratory:  Negative for shortness of breath.    Cardiovascular:  Negative for chest pain, palpitations, orthopnea and PND.   Musculoskeletal:  Negative for neck pain.   Neurological:  Negative for headaches.       Objective   /79   Pulse (!) 120   LMP  (LMP Unknown)   SpO2 95%     Physical Exam  Vitals reviewed.   Constitutional:       General: She is not in acute distress.     Appearance: Normal appearance. She is well-developed.   HENT:      Head: Normocephalic.   Eyes:       Conjunctiva/sclera: Conjunctivae normal.   Cardiovascular:      Rate and Rhythm: Normal rate and regular rhythm.      Heart sounds: Normal heart sounds.   Pulmonary:      Effort: Pulmonary effort is normal.      Breath sounds: Normal breath sounds.   Skin:     Findings: No rash.   Neurological:      Mental Status: She is alert.   Psychiatric:         Mood and Affect: Mood normal.         Behavior: Behavior normal.         Assessment/Plan   Assessment & Plan  Hypertension, unspecified type    Orders:    CBC; Future    Comprehensive Metabolic Panel; Future    Lipid Panel; Future    TSH with reflex to Free T4 if abnormal; Future    POCT glycosylated hemoglobin (Hb A1C) manually resulted    Albumin-Creatinine Ratio, Urine Random; Future    blood-glucose meter misc; Use as directed    blood sugar diagnostic (Blood Glucose Test); 1 strip 3 times a day.    lancets misc; 1 Lancet 3 times a day.    Type 2 diabetes mellitus without complication, without long-term current use of insulin    Orders:    CBC; Future    Comprehensive Metabolic Panel; Future    Lipid Panel; Future    TSH with reflex to Free T4 if abnormal; Future    POCT glycosylated hemoglobin (Hb A1C) manually resulted    Albumin-Creatinine Ratio, Urine Random; Future    blood-glucose meter misc; Use as directed    blood sugar diagnostic (Blood Glucose Test); 1 strip 3 times a day.    lancets misc; 1 Lancet 3 times a day.    Hypertension: Blood pressure today has improved to 136/79.  Continue spironolactone and amlodipine at current dose  Diabetes mellitus: Her A1c today is in the diabetes range at 6.9%.  This is a new diagnosis.  We discussed diabetes mellitus and target A1c and complications.  We discussed importance of regular aerobic exercise and also briefly discussed diet  I sent in a glucometer for her to test her glucose out of the office periodically  Recommended annual dilated eye exams  We we will check labs including UACR and lipid panel  Follow-up in  3 to 4 months to recheck A1c.  Consider starting a diabetic medication if A1c is greater than 7.0% at follow-up

## 2025-06-25 ENCOUNTER — APPOINTMENT (OUTPATIENT)
Dept: OBSTETRICS AND GYNECOLOGY | Facility: CLINIC | Age: 60
End: 2025-06-25
Payer: COMMERCIAL

## 2025-06-25 DIAGNOSIS — N95.0 POSTMENOPAUSAL BLEEDING: Primary | ICD-10-CM

## 2025-06-25 PROCEDURE — 99205 OFFICE O/P NEW HI 60 MIN: CPT | Performed by: OBSTETRICS & GYNECOLOGY

## 2025-06-25 PROCEDURE — 3044F HG A1C LEVEL LT 7.0%: CPT | Performed by: OBSTETRICS & GYNECOLOGY

## 2025-06-25 RX ORDER — LANCETS 33 GAUGE
EACH MISCELLANEOUS
COMMUNITY
Start: 2025-06-20

## 2025-06-25 NOTE — PROGRESS NOTES
GYN PROGRESS NOTE          Chief complaint: Hysterectomy consultation    HPI:  Answers submitted by the patient for this visit:  High Blood Pressure Questionnaire (Submitted on 6/20/2025)  Chief Complaint: Hypertension      HPI       Consult     Additional comments: New pt             Comments    Pt was having pmb dr zurita did hysteroscopy, ultrasound  Pt would like to discuss hysterectomy  she's never had surgery so she is nervous            Last edited by Dinah White MA on 6/25/2025 10:10 AM.      - Patient reports that this is her second episode of post menopausal bleeding with the first episode being around 5 years ago.  At that time she did not seek medical attention.  - Reports that she is actively try to loose weight.  - She has a family history of breast cancer maternal.    Report is proliferative endometrium    Discuss the mechanism of obesity and stimulation endometrium of left unmanaged may become hyperplasia or atypia or even cancer    Reviewed case with patient, reviewed plans.    Discussed the risk of hyperplasia if the post menopausal endometrial stimulation is not treated.    Reviewed the risk benefit of a hormonal IUD which I would recommend if symptoms persist.      ROS:  GEN - no fevers or chills  RESP - no SOB or cough  GYN - see HPI      HISTORY:  Medical History[1]  Surgical History[2]  Social History     Socioeconomic History    Marital status: Single     Spouse name: Not on file    Number of children: Not on file    Years of education: Not on file    Highest education level: Not on file   Occupational History    Not on file   Tobacco Use    Smoking status: Never    Smokeless tobacco: Never   Vaping Use    Vaping status: Never Used   Substance and Sexual Activity    Alcohol use: Never    Drug use: Never    Sexual activity: Not Currently     Partners: Male     Birth control/protection: Abstinence, Post-menopausal   Other Topics Concern    Not on file   Social History Narrative    Not on  file     Social Drivers of Health     Financial Resource Strain: Not on file   Food Insecurity: Not on file   Transportation Needs: Not on file   Physical Activity: Not on file   Stress: Not on file   Social Connections: Not on file   Intimate Partner Violence: Not At Risk (5/23/2025)    Humiliation, Afraid, Rape, and Kick questionnaire     Fear of Current or Ex-Partner: No     Emotionally Abused: No     Physically Abused: No     Sexually Abused: No   Housing Stability: Not on file     Cancer-related family history includes Breast cancer in her mother.       PHYSICAL EXAM:  LMP  (LMP Unknown)   GEN:  A&O, NAD  HEENT:  head HC/AT, no visible goiter  PSYCH:  normal affect, non-anxious      IMPRESSION/PLAN:  59 y.o. post menopausal bleeding proliferative endometrium, increased BMI    - Plan significant weight loss   Reevaluate weight and bleeding patterns consider a hormonal IUD versus hysterectomy if symptoms persist.    Follow up in 6 months    Gina ADAM am scribing for virtually, and in the presence of Dr. Elier Farmer on  06/25/25 at 5:13 PM     Elier Farmer MD          [1]   Past Medical History:  Diagnosis Date    Fibroid May 2025    Hypertension 2021   [2]   Past Surgical History:  Procedure Laterality Date    OTHER SURGICAL HISTORY  05/28/2020    No history of surgery

## 2025-07-21 DIAGNOSIS — I10 HYPERTENSION, UNSPECIFIED TYPE: ICD-10-CM

## 2025-07-21 DIAGNOSIS — E11.9 TYPE 2 DIABETES MELLITUS WITHOUT COMPLICATION, WITHOUT LONG-TERM CURRENT USE OF INSULIN: ICD-10-CM

## 2025-07-21 RX ORDER — LANCETS
1 EACH MISCELLANEOUS 3 TIMES DAILY
Qty: 100 EACH | Refills: 3 | Status: SHIPPED | OUTPATIENT
Start: 2025-07-21

## 2025-07-21 RX ORDER — IBUPROFEN 200 MG
1 CAPSULE ORAL 3 TIMES DAILY
Qty: 100 STRIP | Refills: 3 | Status: SHIPPED | OUTPATIENT
Start: 2025-07-21

## 2025-07-21 RX ORDER — DEXTROSE 4 G
TABLET,CHEWABLE ORAL
Qty: 1 EACH | Refills: 0 | Status: SHIPPED | OUTPATIENT
Start: 2025-07-21

## 2025-08-25 DIAGNOSIS — I10 HYPERTENSION, UNSPECIFIED TYPE: ICD-10-CM

## 2025-08-25 RX ORDER — SPIRONOLACTONE 25 MG/1
25 TABLET ORAL DAILY
Qty: 90 TABLET | Refills: 3 | Status: SHIPPED | OUTPATIENT
Start: 2025-08-25

## 2025-10-20 ENCOUNTER — APPOINTMENT (OUTPATIENT)
Dept: PRIMARY CARE | Facility: CLINIC | Age: 60
End: 2025-10-20
Payer: COMMERCIAL

## 2025-12-29 ENCOUNTER — APPOINTMENT (OUTPATIENT)
Dept: OBSTETRICS AND GYNECOLOGY | Facility: CLINIC | Age: 60
End: 2025-12-29
Payer: COMMERCIAL